# Patient Record
Sex: MALE | Race: WHITE | NOT HISPANIC OR LATINO | Employment: OTHER | URBAN - METROPOLITAN AREA
[De-identification: names, ages, dates, MRNs, and addresses within clinical notes are randomized per-mention and may not be internally consistent; named-entity substitution may affect disease eponyms.]

---

## 2017-01-16 ENCOUNTER — PRE VISIT (OUTPATIENT)
Dept: UROLOGY | Facility: CLINIC | Age: 72
End: 2017-01-16

## 2017-01-16 NOTE — TELEPHONE ENCOUNTER
Spoke with patient and they state appointment is for edex injection follow up. No labs or imaging needed

## 2017-01-19 ENCOUNTER — OFFICE VISIT (OUTPATIENT)
Dept: UROLOGY | Facility: CLINIC | Age: 72
End: 2017-01-19

## 2017-01-19 VITALS
WEIGHT: 190 LBS | SYSTOLIC BLOOD PRESSURE: 127 MMHG | BODY MASS INDEX: 25.73 KG/M2 | HEIGHT: 72 IN | HEART RATE: 66 BPM | DIASTOLIC BLOOD PRESSURE: 81 MMHG

## 2017-01-19 DIAGNOSIS — N52.9 ERECTILE DYSFUNCTION, UNSPECIFIED ERECTILE DYSFUNCTION TYPE: Primary | ICD-10-CM

## 2017-01-19 RX ORDER — VARDENAFIL HYDROCHLORIDE 20 MG/1
20 TABLET ORAL DAILY PRN
Qty: 10 TABLET | Refills: 12 | Status: SHIPPED | OUTPATIENT
Start: 2017-01-19 | End: 2019-06-19 | Stop reason: ALTCHOICE

## 2017-01-19 ASSESSMENT — PAIN SCALES - GENERAL: PAINLEVEL: NO PAIN (0)

## 2017-01-19 NOTE — PATIENT INSTRUCTIONS
Follow up in 1 year for edex medication check     It was a pleasure meeting with you today.  Thank you for allowing me and my team the privilege of caring for you today.  YOU are the reason we are here, and I truly hope we provided you with the excellent service you deserve.  Please let us know if there is anything else we can do for you so that we can be sure you are leaving completely satisfied with your care experience.

## 2017-01-19 NOTE — PROGRESS NOTES
Date: January 19, 2017   Time: 8:46 AM  Patient: Abimael Francisco  MRN: [id number]    HPI/Subjective: Abimael Francisco is a 71 year old male followed by us for stable erectile dysfunction, last seen in January 2016.  At that time, he was using a combination of Edex 40 mcg injections and supplementing with 20 mg Levitra (not together usually).  Since the patient's last visit, he notes no new medical diagnoses nor medications.  He continues to have good results with the Edex & Levitra.  He had his PSA checked with his PCP in August 2015 with the result being 1.48.      No other complaints at this time.     Objective:  /81 mmHg  Pulse 66  Ht 1.829 m (6')  Wt 86.183 kg (190 lb)  BMI 25.76 kg/m2   Gen: In NAD, pleasant & conversant with resident during clinic visit.  Resp: Breathing non-labored on room air    Assessment & Plan: Abimael Francisco is a 70 year old male with stable erectile dysfunction here for prescription renewal.     - Edex & Levitra refilled   - Will plan for return to clinic in one year for evaluation    10min visit, over 50% face to face in counseling/discussion of continuing management of erectile dysfunction.    Harrison Weiss MD  Urology Staff

## 2017-04-10 ENCOUNTER — TELEPHONE (OUTPATIENT)
Dept: UROLOGY | Facility: CLINIC | Age: 72
End: 2017-04-10

## 2017-04-10 NOTE — TELEPHONE ENCOUNTER
Hello,    The  is experiencing delays (edex).  May we have an rx for caverject for him until the edex comes into stock?    Hilario Hutson Pharm. D.  [facility] Pharmacy Manager  [phone]  4/10/2017

## 2018-01-02 ENCOUNTER — PRE VISIT (OUTPATIENT)
Dept: UROLOGY | Facility: CLINIC | Age: 73
End: 2018-01-02

## 2018-01-02 NOTE — TELEPHONE ENCOUNTER
Patient with history of ED coming in for annual follow up. Patient chart reviewed, no need for call, all records available and ready for appointment.

## 2018-04-09 ENCOUNTER — OFFICE VISIT (OUTPATIENT)
Dept: UROLOGY | Facility: CLINIC | Age: 73
End: 2018-04-09
Payer: COMMERCIAL

## 2018-04-09 VITALS — OXYGEN SATURATION: 98 % | SYSTOLIC BLOOD PRESSURE: 108 MMHG | DIASTOLIC BLOOD PRESSURE: 65 MMHG | HEART RATE: 96 BPM

## 2018-04-09 DIAGNOSIS — R39.12 BENIGN PROSTATIC HYPERPLASIA WITH WEAK URINARY STREAM: ICD-10-CM

## 2018-04-09 DIAGNOSIS — N40.1 BENIGN PROSTATIC HYPERPLASIA WITH WEAK URINARY STREAM: ICD-10-CM

## 2018-04-09 DIAGNOSIS — N52.9 ERECTILE DYSFUNCTION, UNSPECIFIED ERECTILE DYSFUNCTION TYPE: Primary | ICD-10-CM

## 2018-04-09 PROCEDURE — 99213 OFFICE O/P EST LOW 20 MIN: CPT | Performed by: UROLOGY

## 2018-04-09 RX ORDER — TAMSULOSIN HYDROCHLORIDE 0.4 MG/1
0.4 CAPSULE ORAL PRN
Refills: 2 | COMMUNITY
Start: 2017-12-20 | End: 2020-04-01

## 2018-04-09 RX ORDER — FINASTERIDE 5 MG/1
5 TABLET, FILM COATED ORAL DAILY
Qty: 90 TABLET | Refills: 3 | Status: SHIPPED | OUTPATIENT
Start: 2018-04-09 | End: 2019-04-15

## 2018-04-09 RX ORDER — ALPROSTADIL 40 UG/ML
INJECTION, POWDER, LYOPHILIZED, FOR SOLUTION INTRACAVERNOUS
OUTPATIENT
Start: 2018-04-09

## 2018-04-09 RX ORDER — PRAVASTATIN SODIUM 80 MG/1
80 TABLET ORAL DAILY
Refills: 3 | COMMUNITY
Start: 2018-01-10

## 2018-04-09 RX ORDER — CLOPIDOGREL BISULFATE 75 MG/1
75 TABLET ORAL DAILY
Refills: 1 | COMMUNITY
Start: 2018-01-08

## 2018-04-09 ASSESSMENT — PAIN SCALES - GENERAL: PAINLEVEL: NO PAIN (0)

## 2018-04-09 NOTE — TELEPHONE ENCOUNTER
Received signed paper prescription for Edex from Dr. Weiss. Prescription hand carried to [facility] pharmacy by writer.     Shweta Hassan RN, BSN

## 2018-04-09 NOTE — NURSING NOTE
Abimael Francisco's goals for this visit include:   Chief Complaint   Patient presents with     RECHECK     Med Refill       He requests these members of his care team be copied on today's visit information: Yes    PCP: Beny Ambrose    Referring Provider:  Beny Ambrose MD  [address] [address]    Chief Complaint   Patient presents with     RECHECK     Med Refill       Initial /65 (BP Location: Left arm, Patient Position: Sitting, Cuff Size: Adult Regular)  Pulse 96  SpO2 98% Estimated body mass index is 25.77 kg/(m^2) as calculated from the following:    Height as of 1/19/17: 1.829 m (6').    Weight as of 1/19/17: 86.2 kg (190 lb).  Medication Reconciliation: complete    Do you need any medication refills at today's visit? Yes      post void residual  90mL

## 2018-04-09 NOTE — MR AVS SNAPSHOT
After Visit Summary   4/9/2018    Abimael Francisco    MRN: [id number]           Patient Information     Date Of Birth          1945        Visit Information        Provider Department      4/9/2018 8:30 AM Harrison Weiss MD [facility]        Today's Diagnoses     Erectile dysfunction, unspecified erectile dysfunction type    -  1    Benign prostatic hyperplasia with weak urinary stream           Follow-ups after your visit        Your next 10 appointments already scheduled     Sep 17, 2018  8:30 AM CDT   Return Visit with Harrison Weiss MD   [facility] ([facility])    [id number] [place] [id number] [phone]              Who to contact     If you have questions or need follow up information about today's clinic visit or your schedule please contact [facility] directly at [phone].  Normal or non-critical lab and imaging results will be communicated to you by FinAnalyticahart, letter or phone within 4 business days after the clinic has received the results. If you do not hear from us within 7 days, please contact the clinic through FinAnalyticahart or phone. If you have a critical or abnormal lab result, we will notify you by phone as soon as possible.  Submit refill requests through [employer] or call your pharmacy and they will forward the refill request to us. Please allow 3 business days for your refill to be completed.          Additional Information About Your Visit        MyChart Information     [employer] gives you secure access to your electronic health record. If you see a primary care provider, you can also send messages to your care team and make appointments. If you have questions, please call your primary care clinic.  If you do not have a primary care provider, please call [phone] and they will assist you.      [employer] is an electronic gateway that provides easy, online access to your medical  records. With Dashride, you can request a clinic appointment, read your test results, renew a prescription or communicate with your care team.     To access your existing account, please contact your [facility] or call [phone] for assistance.        Care EveryWhere ID     This is your Care EveryWhere ID. This could be used by other organizations to access your [place] medical records  [id number]        Your Vitals Were     Pulse Pulse Oximetry                96 98%           Blood Pressure from Last 3 Encounters:   04/09/18 108/65   01/19/17 127/81   12/28/15 116/74    Weight from Last 3 Encounters:   01/19/17 86.2 kg (190 lb)   12/28/15 86.5 kg (190 lb 12.8 oz)   01/14/15 87.5 kg (192 lb 12.8 oz)              Today, you had the following     No orders found for display         Today's Medication Changes          These changes are accurate as of 4/9/18  9:21 AM.  If you have any questions, ask your nurse or doctor.               Start taking these medicines.        Dose/Directions    finasteride 5 MG tablet   Commonly known as:  PROSCAR   Used for:  Benign prostatic hyperplasia with weak urinary stream   Started by:  Harrison Weiss MD        Dose:  5 mg   Take 1 tablet (5 mg) by mouth daily   Quantity:  90 tablet   Refills:  3         These medicines have changed or have updated prescriptions.        Dose/Directions    * alprostadil 40 MCG kit   Commonly known as:  EDEX   This may have changed:  Another medication with the same name was added. Make sure you understand how and when to take each.   Used for:  ED (erectile dysfunction)   Changed by:  Harrison Weiss MD        Dose:  40 mcg   40 mcg by Intracavitary route as needed for erectile dysfunction use no more than 3 times per week   Quantity:  6 each   Refills:  99       * Alprostadil (Vasodilator) 40 MCG Solr   This may have changed:  Another medication with the same name was added. Make sure you understand how  and when to take each.   Used for:  Erectile dysfunction, unspecified erectile dysfunction type   Changed by:  Harrison Weiss MD        Dose:  40 mcg   40 mcg by INTRACAVERNOSAL route daily as needed Maximum use once daily and up to 3x/week.   Quantity:  6 each   Refills:  12       * alprostadil 40 MCG kit   Commonly known as:  EDEX   This may have changed:  Another medication with the same name was added. Make sure you understand how and when to take each.   Used for:  Erectile dysfunction, unspecified erectile dysfunction type   Changed by:  Harrison Weiss MD        Dose:  40 mcg   40 mcg by Intracavitary route as needed for erectile dysfunction use no more than 3 times per week   Quantity:  6 mcg   Refills:  99       * alprostadil 40 MCG kit   Commonly known as:  EDEX   This may have changed:  You were already taking a medication with the same name, and this prescription was added. Make sure you understand how and when to take each.   Used for:  Erectile dysfunction, unspecified erectile dysfunction type   Changed by:  Harrison Weiss MD        Dose:  40 mcg   40 mcg by Intracavitary route as needed for erectile dysfunction use no more than 3 times per week   Quantity:  6 Syringe   Refills:  11       * Notice:  This list has 4 medication(s) that are the same as other medications prescribed for you. Read the directions carefully, and ask your doctor or other care provider to review them with you.      Stop taking these medicines if you haven't already. Please contact your care team if you have questions.     lisinopril 10 MG tablet   Commonly known as:  PRINIVIL/ZESTRIL   Stopped by:  Harrison Weiss MD                Where to get your medicines      These medications were sent to [facility] - [place], [place] - [address], [address]  [address], [facility] [address]     Phone:  [phone]     finasteride 5 MG tablet         Some of these will need a  paper prescription and others can be bought over the counter.  Ask your nurse if you have questions.     Bring a paper prescription for each of these medications     alprostadil 40 MCG kit                Primary Care Provider Office Phone # Fax #    Beny Ambrose [phone]       [address]  [place] [address]        Equal Access to Services     [facility]: Hadii aad ku hadasho Soomaali, waaxda luqadaha, qaybta kaalmada adeegyada, waxay idiin hayaan adeeg kharash la'aan ah. So [facility] [phone].    ATENCIÓN: Si habla español, tiene a warner disposición servicios gratuitos de asistencia lingüística. Mariamame al [phone].    We comply with applicable federal civil rights laws and [place] laws. We do not discriminate on the basis of race, color, national origin, age, disability, sex, sexual orientation, or gender identity.            Thank you!     Thank you for choosing [facility]  for your care. Our goal is always to provide you with excellent care. Hearing back from our patients is one way we can continue to improve our services. Please take a few minutes to complete the written survey that you may receive in the mail after your visit with us. Thank you!             Your Updated Medication List - Protect others around you: Learn how to safely use, store and throw away your medicines at www.disposemymeds.org.          This list is accurate as of 4/9/18  9:21 AM.  Always use your most recent med list.                   Brand Name Dispense Instructions for use Diagnosis    * alprostadil 40 MCG kit    EDEX    6 each    40 mcg by Intracavitary route as needed for erectile dysfunction use no more than 3 times per week    ED (erectile dysfunction)       * Alprostadil (Vasodilator) 40 MCG Solr     6 each    40 mcg by INTRACAVERNOSAL route daily as needed Maximum use once daily and up to 3x/week.    Erectile dysfunction, unspecified erectile dysfunction type     "   * alprostadil 40 MCG kit    EDEX    6 mcg    40 mcg by Intracavitary route as needed for erectile dysfunction use no more than 3 times per week    Erectile dysfunction, unspecified erectile dysfunction type       * alprostadil 40 MCG kit    EDEX    6 Syringe    40 mcg by Intracavitary route as needed for erectile dysfunction use no more than 3 times per week    Erectile dysfunction, unspecified erectile dysfunction type       BD insulin syringe 29G X 1/2\" 1 ML Misc   Generic drug:  Insulin Syringe     250 each    1 Syringe. Use as directed    Erectile dysfunction       clopidogrel 75 MG tablet    PLAVIX     Take 75 mg by mouth daily        finasteride 5 MG tablet    PROSCAR    90 tablet    Take 1 tablet (5 mg) by mouth daily    Benign prostatic hyperplasia with weak urinary stream       fish Oil 1200 MG capsule      3 CAPS DAILY        LIPITOR PO      1/2 tablet daily        metFORMIN 500 MG tablet    GLUCOPHAGE     1 TABLET DAILY WITH FOOD        MULTI VITAMIN MENS PO      1 TABLET DAILY        pravastatin 80 MG tablet    PRAVACHOL     Take 80 mg by mouth daily        tamsulosin 0.4 MG capsule    FLOMAX     Take 0.4 mg by mouth as needed        * vardenafil 20 MG tablet    LEVITRA    10 tablet    Take 1 tablet by mouth daily as needed for erectile dysfunction.    Erectile dysfunction       * vardenafil 20 MG tablet    LEVITRA    10 tablet    Take 1 tablet (20 mg) by mouth daily as needed for erectile dysfunction    Erectile dysfunction, unspecified erectile dysfunction type       * vardenafil 20 MG tablet    LEVITRA    10 tablet    Take 1 tablet (20 mg) by mouth daily as needed for erectile dysfunction    Erectile dysfunction, unspecified erectile dysfunction type       * Notice:  This list has 7 medication(s) that are the same as other medications prescribed for you. Read the directions carefully, and ask your doctor or other care provider to review them with you.      "

## 2018-04-09 NOTE — TELEPHONE ENCOUNTER
Hello,  last fill date:01-  Last quantity:6    Thank You,  Petra Kennedy  Pharmacy Technician  [facility]  [phone]

## 2018-04-09 NOTE — PROGRESS NOTES
Pt here for Edex renewal.    Levitra not working well for him anymore.    Noting slow flow at time, especially at night.  Nocturia 1 usually, sometimes more often.  Varies on intake.  In rare cases up many times per night.      A-  - Erectile dysfunction  - BPH with complains of slow flow, especially at night.    Plan  - renewed Edex 40mcg kit for him.  - add finasteride  - continue Flomax.  - return to clinic 3-6 months for lower urinary tract symptoms check.  - PVR today 90cc.      Aj BENITES    15min visit, over 50% face to face in counseling/discussion of above issues.

## 2018-09-17 ENCOUNTER — OFFICE VISIT (OUTPATIENT)
Dept: UROLOGY | Facility: CLINIC | Age: 73
End: 2018-09-17
Payer: COMMERCIAL

## 2018-09-17 VITALS — HEART RATE: 74 BPM | DIASTOLIC BLOOD PRESSURE: 79 MMHG | SYSTOLIC BLOOD PRESSURE: 134 MMHG | OXYGEN SATURATION: 96 %

## 2018-09-17 DIAGNOSIS — N52.9 ERECTILE DYSFUNCTION, UNSPECIFIED ERECTILE DYSFUNCTION TYPE: ICD-10-CM

## 2018-09-17 DIAGNOSIS — N40.1 BENIGN PROSTATIC HYPERPLASIA WITH WEAK URINARY STREAM: Primary | ICD-10-CM

## 2018-09-17 DIAGNOSIS — R39.12 BENIGN PROSTATIC HYPERPLASIA WITH WEAK URINARY STREAM: Primary | ICD-10-CM

## 2018-09-17 PROCEDURE — 99213 OFFICE O/P EST LOW 20 MIN: CPT | Performed by: UROLOGY

## 2018-09-17 ASSESSMENT — PAIN SCALES - GENERAL: PAINLEVEL: NO PAIN (0)

## 2018-09-17 NOTE — NURSING NOTE
Abimael Francisco's goals for this visit include:   Chief Complaint   Patient presents with     RECHECK     6 month f/u       He requests these members of his care team be copied on today's visit information: Yes    PCP: Beny Ambrose    Referring Provider:  No referring provider defined for this encounter.    /79 (BP Location: Left arm, Patient Position: Sitting, Cuff Size: Adult Regular)  Pulse 74  SpO2 96%    Do you need any medication refills at today's visit? No

## 2018-09-17 NOTE — PROGRESS NOTES
"Abimael Francisco is a 72 year old male is here for BPH follow-up.  He has been using Flomax and finasteride.    Previously used Levitra, but this doesn't work well anymore.  He's had good success with Edex and continues to use this as needed.  He's noted better flow with adding 5ARi 5 months ago.  Notes better flow day and night, often good stream.  No new complaints or bother.  Discussed sound wave therapy.  I recommended he save his $.    Current Outpatient Prescriptions   Medication     alprostadil (EDEX) 40 MCG injection     alprostadil (EDEX) 40 MCG kit     alprostadil (EDEX) 40 MCG kit     finasteride (PROSCAR) 5 MG tablet     FISH OIL 1200 MG OR CAPS     LIPITOR OR     METFORMIN  MG OR TABS     MULTI VITAMIN MENS OR     vardenafil (LEVITRA) 20 MG tablet     vardenafil (LEVITRA) 20 MG tablet     vardenafil (LEVITRA) 20 MG tablet     Alprostadil, Vasodilator, 40 MCG SOLR     clopidogrel (PLAVIX) 75 MG tablet     Insulin Syringe-Needle U-100 (BD INSULIN SYRINGE) 29G X 1/2\" 1 ML MISC     pravastatin (PRAVACHOL) 80 MG tablet     tamsulosin (FLOMAX) 0.4 MG capsule     No current facility-administered medications for this visit.      /79 (BP Location: Left arm, Patient Position: Sitting, Cuff Size: Adult Regular)  Pulse 74  SpO2 96%   Alert, oriented, nad  Eyes anicteric.  Pulse regular  Abdomen nondistended.  resps normal, non-labored.   exam deferred.    A-  Erectile dysfunction  BPH    Plan  Continue Edex  Continue Flomax and Finsteride  Return to clinic 1 year, sooner if needed.  PVR today 52, better than 90cc before finasteride.    Aj BENITES          "

## 2018-09-17 NOTE — MR AVS SNAPSHOT
After Visit Summary   9/17/2018    Abimael Francisco    MRN: [id number]           Patient Information     Date Of Birth          1945        Visit Information        Provider Department      9/17/2018 8:30 AM Harrison Weiss MD [facility]        Today's Diagnoses     Benign prostatic hyperplasia with weak urinary stream    -  1    Erectile dysfunction, unspecified erectile dysfunction type           Follow-ups after your visit        Follow-up notes from your care team     Return in about 1 year (around 9/17/2019), or if symptoms worsen or fail to improve.      Who to contact     If you have questions or need follow up information about today's clinic visit or your schedule please contact [facility] directly at [phone].  Normal or non-critical lab and imaging results will be communicated to you by Neogrowthhart, letter or phone within 4 business days after the clinic has received the results. If you do not hear from us within 7 days, please contact the clinic through Neogrowthhart or phone. If you have a critical or abnormal lab result, we will notify you by phone as soon as possible.  Submit refill requests through [employer] or call your pharmacy and they will forward the refill request to us. Please allow 3 business days for your refill to be completed.          Additional Information About Your Visit        Neogrowthhart Information     [employer] gives you secure access to your electronic health record. If you see a primary care provider, you can also send messages to your care team and make appointments. If you have questions, please call your primary care clinic.  If you do not have a primary care provider, please call [phone] and they will assist you.      [employer] is an electronic gateway that provides easy, online access to your medical records. With [employer], you can request a clinic appointment, read your test results, renew a prescription or communicate with your  care team.     To access your existing account, please contact your [facility] or call [phone] for assistance.        Care EveryWhere ID     This is your Care EveryWhere ID. This could be used by other organizations to access your [place] medical records  [id number]        Your Vitals Were     Pulse Pulse Oximetry                74 96%           Blood Pressure from Last 3 Encounters:   09/17/18 134/79   04/09/18 108/65   01/19/17 127/81    Weight from Last 3 Encounters:   01/19/17 86.2 kg (190 lb)   12/28/15 86.5 kg (190 lb 12.8 oz)   01/14/15 87.5 kg (192 lb 12.8 oz)              Today, you had the following     No orders found for display       Primary Care Provider Office Phone # Fax #    Beny Ambrose [phone]       [address] [place] [address]        Equal Access to Services     TAURUS FREITAS : Hadii aad ku hadasho Soomaali, waaxda luqadaha, qaybta kaalmada adeegyada, waxay idiin hayaan gonzaleseg jeremy macario . So [facility] [phone].    ATENCIÓN: Si habla español, tiene a warner disposición servicios gratuitos de asistencia lingüística. Llame al [phone].    We comply with applicable federal civil rights laws and [place] laws. We do not discriminate on the basis of race, color, national origin, age, disability, sex, sexual orientation, or gender identity.            Thank you!     Thank you for choosing [facility]  for your care. Our goal is always to provide you with excellent care. Hearing back from our patients is one way we can continue to improve our services. Please take a few minutes to complete the written survey that you may receive in the mail after your visit with us. Thank you!             Your Updated Medication List - Protect others around you: Learn how to safely use, store and throw away your medicines at www.disposemymeds.org.          This list is accurate as of 9/17/18  8:47 AM.  Always  "use your most recent med list.                   Brand Name Dispense Instructions for use Diagnosis    * alprostadil 40 MCG kit    EDEX    6 each    40 mcg by Intracavitary route as needed for erectile dysfunction use no more than 3 times per week    ED (erectile dysfunction)       * Alprostadil (Vasodilator) 40 MCG Solr     6 each    40 mcg by INTRACAVERNOSAL route daily as needed Maximum use once daily and up to 3x/week.    Erectile dysfunction, unspecified erectile dysfunction type       * alprostadil 40 MCG kit    EDEX    6 mcg    40 mcg by Intracavitary route as needed for erectile dysfunction use no more than 3 times per week    Erectile dysfunction, unspecified erectile dysfunction type       * alprostadil 40 MCG kit    EDEX    6 Syringe    40 mcg by Intracavitary route as needed for erectile dysfunction use no more than 3 times per week    Erectile dysfunction, unspecified erectile dysfunction type       BD insulin syringe 29G X 1/2\" 1 ML Misc   Generic drug:  Insulin Syringe     250 each    1 Syringe. Use as directed    Erectile dysfunction       clopidogrel 75 MG tablet    PLAVIX     Take 75 mg by mouth daily        finasteride 5 MG tablet    PROSCAR    90 tablet    Take 1 tablet (5 mg) by mouth daily    Benign prostatic hyperplasia with weak urinary stream       fish Oil 1200 MG capsule      3 CAPS DAILY        LIPITOR PO      1/2 tablet daily        metFORMIN 500 MG tablet    GLUCOPHAGE     1 TABLET DAILY WITH FOOD        MULTI VITAMIN MENS PO      1 TABLET DAILY        pravastatin 80 MG tablet    PRAVACHOL     Take 80 mg by mouth daily        tamsulosin 0.4 MG capsule    FLOMAX     Take 0.4 mg by mouth as needed        * vardenafil 20 MG tablet    LEVITRA    10 tablet    Take 1 tablet by mouth daily as needed for erectile dysfunction.    Erectile dysfunction       * vardenafil 20 MG tablet    LEVITRA    10 tablet    Take 1 tablet (20 mg) by mouth daily as needed for erectile dysfunction    Erectile " dysfunction, unspecified erectile dysfunction type       * vardenafil 20 MG tablet    LEVITRA    10 tablet    Take 1 tablet (20 mg) by mouth daily as needed for erectile dysfunction    Erectile dysfunction, unspecified erectile dysfunction type       * Notice:  This list has 7 medication(s) that are the same as other medications prescribed for you. Read the directions carefully, and ask your doctor or other care provider to review them with you.

## 2019-04-15 DIAGNOSIS — N52.9 ERECTILE DYSFUNCTION, UNSPECIFIED ERECTILE DYSFUNCTION TYPE: ICD-10-CM

## 2019-04-15 DIAGNOSIS — R39.12 BENIGN PROSTATIC HYPERPLASIA WITH WEAK URINARY STREAM: ICD-10-CM

## 2019-04-15 DIAGNOSIS — N40.1 BENIGN PROSTATIC HYPERPLASIA WITH WEAK URINARY STREAM: ICD-10-CM

## 2019-04-15 RX ORDER — FINASTERIDE 5 MG/1
5 TABLET, FILM COATED ORAL DAILY
Qty: 90 TABLET | Refills: 3 | Status: SHIPPED | OUTPATIENT
Start: 2019-04-15 | End: 2020-04-01

## 2019-04-15 NOTE — TELEPHONE ENCOUNTER
Received signed paper prescription for Edex from Dr. Weiss. Paper prescription brought to [facility] pharmacy by writer.    Shweta Hassan RN, BSN

## 2019-04-15 NOTE — TELEPHONE ENCOUNTER
Hello,  last fill date:11-  Last quantity:90 days    Thank You,  Petra Kennedy  Pharmacy Technician  [facility]  [phone]

## 2019-06-19 ENCOUNTER — OFFICE VISIT (OUTPATIENT)
Dept: UROLOGY | Facility: CLINIC | Age: 74
End: 2019-06-19
Payer: MEDICARE

## 2019-06-19 VITALS — DIASTOLIC BLOOD PRESSURE: 67 MMHG | HEART RATE: 74 BPM | SYSTOLIC BLOOD PRESSURE: 115 MMHG | OXYGEN SATURATION: 96 %

## 2019-06-19 DIAGNOSIS — N40.1 BPH WITH OBSTRUCTION/LOWER URINARY TRACT SYMPTOMS: ICD-10-CM

## 2019-06-19 DIAGNOSIS — N52.9 ERECTILE DYSFUNCTION, UNSPECIFIED ERECTILE DYSFUNCTION TYPE: Primary | ICD-10-CM

## 2019-06-19 DIAGNOSIS — N13.8 BPH WITH OBSTRUCTION/LOWER URINARY TRACT SYMPTOMS: ICD-10-CM

## 2019-06-19 PROCEDURE — 99213 OFFICE O/P EST LOW 20 MIN: CPT | Performed by: UROLOGY

## 2019-06-19 RX ORDER — TAMSULOSIN HYDROCHLORIDE 0.4 MG/1
0.4 CAPSULE ORAL DAILY
Qty: 90 CAPSULE | Refills: 3 | Status: SHIPPED | OUTPATIENT
Start: 2019-06-19 | End: 2020-04-01

## 2019-06-19 ASSESSMENT — PAIN SCALES - GENERAL: PAINLEVEL: NO PAIN (0)

## 2019-06-19 NOTE — PROGRESS NOTES
Abimael Francisco is a 73 year old male is here for BPH follow-up.  He has been using Flomax and finasteride.    He's had good success with Edex 40mcg and continues to use this as needed.  Requests refill.  No new complaints or bother.  He feels that urination is going well for him.     ROS  Overall voiding well.  No new significant voiding complaints.  No gross hematuria.  No new bothersome symptoms or signs.    Current Outpatient Medications   Medication     alprostadil (EDEX) 40 MCG injection     alprostadil (EDEX) 40 MCG kit     alprostadil (EDEX) 40 MCG kit     Alprostadil, Vasodilator, 40 MCG SOLR     Alprostadil, Vasodilator, 40 MCG SOLR     clopidogrel (PLAVIX) 75 MG tablet     finasteride (PROSCAR) 5 MG tablet     FISH OIL 1200 MG OR CAPS     LIPITOR OR     METFORMIN  MG OR TABS     MULTI VITAMIN MENS OR     pravastatin (PRAVACHOL) 80 MG tablet     tamsulosin (FLOMAX) 0.4 MG capsule     tamsulosin (FLOMAX) 0.4 MG capsule     No current facility-administered medications for this visit.      /67 (BP Location: Left arm, Patient Position: Sitting, Cuff Size: Adult Regular)   Pulse 74   SpO2 96%    Alert, oriented, nad  Eyes anicteric.  Pulse regular  Abdomen nondistended.  resps normal, non-labored.   exam deferred.    A-  Erectile dysfunction  BPH    Plan  Refilled Edex  Continue Flomax and Finsteride.  Refilled alpha-blocker for him today.  Return to clinic 1 year, sooner if needed.  PVR 52cc 9/2018.  Not rechecked today.    15min visit, over 50% face to face in counseling/discussion of erectile dysfunction and BPH medication management.    Aj BENITES

## 2019-06-19 NOTE — NURSING NOTE
Abimael Francisco's goals for this visit include:   Chief Complaint   Patient presents with     RECHECK     Yearly f/u- Flomax       He requests these members of his care team be copied on today's visit information: Yes    PCP: Beny Ambrose    Referring Provider:  Beny Ambrose MD  [facility]  [address] [place] [address]    /67 (BP Location: Left arm, Patient Position: Sitting, Cuff Size: Adult Regular)   Pulse 74   SpO2 96%     Do you need any medication refills at today's visit? Yes- Flomax

## 2019-10-04 ENCOUNTER — HEALTH MAINTENANCE LETTER (OUTPATIENT)
Age: 74
End: 2019-10-04

## 2019-11-29 ENCOUNTER — TELEPHONE (OUTPATIENT)
Dept: UROLOGY | Facility: CLINIC | Age: 74
End: 2019-11-29

## 2019-11-29 DIAGNOSIS — N52.9 ERECTILE DYSFUNCTION, UNSPECIFIED ERECTILE DYSFUNCTION TYPE: ICD-10-CM

## 2019-11-29 NOTE — TELEPHONE ENCOUNTER
Health Call Center    Phone Message    May a detailed message be left on voicemail: yes    Reason for Call: Medication Refill Request    Has the patient contacted the pharmacy for the refill? Yes   Name of medication being requested: alprostadil (EDEX) 40 MCG kit  Provider who prescribed the medication: Dr. Weiss   Date medication is needed: 12/2/2019   Patient called stating that he is almost out of his Rx and would need a new Rx to get any refills. He is scheduled 1/15/2020 and wants to know if he can get it before his appointment. Please advise. Thank you.    Action Taken: Message routed to:  Adult Clinics: Urology p [id number]

## 2019-11-29 NOTE — TELEPHONE ENCOUNTER
Medication request: Alprostadil, Vasodilator, 40 MCG SOLR  Si mcg by INTRACAVERNOSAL route daily as needed (take for intercourse.  Once daily and up to 3 times a week.)   Prescription written: 19  Last Qty: 6 syringe  Pt's last office visit: 19  Next scheduled office visit: 1/15/20    Chart reviewed and medication not on refill protocol. Refill request sent to Dr. Weiss to review and sign if appropriate.     Called and spoke to patient who is aware of the above information.    Shweta Hassan RN, BSN

## 2019-12-02 NOTE — TELEPHONE ENCOUNTER
Received message from Dr. Weiss and everett to refill Edex 40mcg per 6/19/19 order. Medication refilled and sent for Dr. Weiss to review and sign. Prescription sent to [facility] in [place].    Called and spoke to patient who is aware of the above information.    Shweta Hassan RN, BSN

## 2020-02-10 ENCOUNTER — HEALTH MAINTENANCE LETTER (OUTPATIENT)
Age: 75
End: 2020-02-10

## 2020-04-01 ENCOUNTER — VIRTUAL VISIT (OUTPATIENT)
Dept: UROLOGY | Facility: CLINIC | Age: 75
End: 2020-04-01

## 2020-04-01 DIAGNOSIS — R39.12 BENIGN PROSTATIC HYPERPLASIA WITH WEAK URINARY STREAM: ICD-10-CM

## 2020-04-01 DIAGNOSIS — N40.1 BPH WITH OBSTRUCTION/LOWER URINARY TRACT SYMPTOMS: ICD-10-CM

## 2020-04-01 DIAGNOSIS — N52.9 ERECTILE DYSFUNCTION, UNSPECIFIED ERECTILE DYSFUNCTION TYPE: ICD-10-CM

## 2020-04-01 DIAGNOSIS — N40.1 BENIGN PROSTATIC HYPERPLASIA WITH WEAK URINARY STREAM: ICD-10-CM

## 2020-04-01 DIAGNOSIS — N13.8 BPH WITH OBSTRUCTION/LOWER URINARY TRACT SYMPTOMS: ICD-10-CM

## 2020-04-01 PROCEDURE — 99207 ZZC NO BILLABLE SERVICE THIS VISIT: CPT | Performed by: UROLOGY

## 2020-04-01 RX ORDER — ALPROSTADIL 40 UG/ML
40 INJECTION, POWDER, LYOPHILIZED, FOR SOLUTION INTRACAVERNOUS PRN
Qty: 6 KIT | Refills: 30 | Status: SHIPPED | OUTPATIENT
Start: 2020-04-01 | End: 2021-02-24

## 2020-04-01 RX ORDER — TAMSULOSIN HYDROCHLORIDE 0.4 MG/1
0.4 CAPSULE ORAL DAILY
Qty: 90 CAPSULE | Refills: 3 | Status: SHIPPED | OUTPATIENT
Start: 2020-04-01 | End: 2021-02-24

## 2020-04-01 RX ORDER — FINASTERIDE 5 MG/1
5 TABLET, FILM COATED ORAL DAILY
Qty: 90 TABLET | Refills: 3 | Status: SHIPPED | OUTPATIENT
Start: 2020-04-01 | End: 2021-02-24

## 2020-04-01 NOTE — PROGRESS NOTES
"Abimael Francisco is a 74 year old male who is being evaluated via a billable telephone visit.      The patient has been notified of following:     \"This telephone visit will be conducted via a call between you and your physician/provider. We have found that certain health care needs can be provided without the need for a physical exam.  This service lets us provide the care you need with a short phone conversation.  If a prescription is necessary we can send it directly to your pharmacy.  If lab work is needed we can place an order for that and you can then stop by our lab to have the test done at a later time.    If during the course of the call the physician/provider feels a telephone visit is not appropriate, you will not be charged for this service.\"     Patient has given verbal consent for Telephone visit?  Yes    Abimael Francisco complains of    Chief Complaint   Patient presents with     Consult For     edex cbg uyrine prescription [id number]       I have reviewed and updated the patient's Past Medical History, Social History, Family History and Medication List.    ALLERGIES  Patient has no known allergies.    Additional provider notes:  Pt with ED.  He would like refill of 40mcg Edex from [facility].  This therapy is working well for him.    He also needs refills of Flomax and finasteride as well.  These are also working well, he's able to void without trouble.  These are to be sent to the [facility] in [place].      Phone call duration: 3  Minutes starting at 0835. No charge LOS.      Harrison Weiss MD    "

## 2020-04-01 NOTE — NURSING NOTE
"Abimael Francisco is a 74 year old male who is being evaluated via a billable telephone visit.      The patient has been notified of following:     \"This telephone visit will be conducted via a call between you and your physician/provider. We have found that certain health care needs can be provided without the need for a physical exam.  This service lets us provide the care you need with a short phone conversation.  If a prescription is necessary we can send it directly to your pharmacy.  If lab work is needed we can place an order for that and you can then stop by our lab to have the test done at a later time.    If during the course of the call the physician/provider feels a telephone visit is not appropriate, you will not be charged for this service.\"     Patient has given verbal consent for Telephone visit?  Yes    Lew Michael MA      Abimael Francisco complains of    Chief Complaint   Patient presents with     Consult For     edex cbg uyrine prescription [id number]       I have reviewed and updated the patient's Past Medical History, Social History, Family History and Medication List.    ALLERGIES  Patient has no known allergies.        "

## 2020-04-03 DIAGNOSIS — N40.1 BENIGN PROSTATIC HYPERPLASIA WITH WEAK URINARY STREAM: ICD-10-CM

## 2020-04-03 DIAGNOSIS — R39.12 BENIGN PROSTATIC HYPERPLASIA WITH WEAK URINARY STREAM: ICD-10-CM

## 2020-04-07 RX ORDER — FINASTERIDE 5 MG/1
TABLET, FILM COATED ORAL
Qty: 90 TABLET | Refills: 3 | OUTPATIENT
Start: 2020-04-07

## 2020-06-09 ENCOUNTER — TELEPHONE (OUTPATIENT)
Dept: UROLOGY | Facility: CLINIC | Age: 75
End: 2020-06-09

## 2020-06-09 DIAGNOSIS — N52.9 ERECTILE DYSFUNCTION, UNSPECIFIED ERECTILE DYSFUNCTION TYPE: Primary | ICD-10-CM

## 2020-06-09 NOTE — TELEPHONE ENCOUNTER
Patient started their own Pa. Question set received via fax, located in MAIN UMP FOLDER

## 2020-06-10 NOTE — TELEPHONE ENCOUNTER
Central Prior Authorization Team   Phone: [phone]      PA Initiation    Medication: alprostadil (EDEX) 40 MCG kit -PA initiated  Insurance Company: [employer] - Phone [phone] Fax [phone]  Pharmacy Filling the Rx: [facility] - [place], [place] - [facility] [address]  Filling Pharmacy Phone: [phone]  Filling Pharmacy Fax:    Start Date: 6/10/2020

## 2020-06-10 NOTE — TELEPHONE ENCOUNTER
Prior Authorization Retail Medication Request    Medication/Dose: alprostadil (EDEX) 40 MCG kit  ICD code (if different than what is on RX):    Previously Tried and Failed:    Rationale:      Insurance Name:  [facility]  Insurance ID:  [id number]       Pharmacy Information (if different than what is on RX)  Name:    Phone:

## 2020-06-10 NOTE — TELEPHONE ENCOUNTER
Attempted to reach patient by phone, but no answer. Left generic message with request for patient to return call to clinic.     When patient returns call, will request for call center to verify that updated insurance information is in patient's chart. Once updated insurance information is received, will send request to the PA team to discuss approval for Edex medication.    Shweta Hassan RN, BSN

## 2020-06-10 NOTE — TELEPHONE ENCOUNTER
Sent additional information to [facility] via [employer]

## 2020-06-11 NOTE — TELEPHONE ENCOUNTER
PRIOR AUTHORIZATION DENIED    Medication: alprostadil (EDEX) 40 MCG kit -PA denied    Denial Date: 6/10/2020    Denial Rational:         Appeal Information:

## 2020-06-16 NOTE — TELEPHONE ENCOUNTER
Called and spoke to patient regarding note below. Informed patient that a message will be sent to Dr. Weiss regarding recommendations on how to proceed. Informed patient that he will be contacted with recommendations from Dr. Weiss once additional information is known. Patient verbalized understanding and was comfortable with plan.    Shweta Hassan RN, BSN

## 2020-06-16 NOTE — TELEPHONE ENCOUNTER
Patient calling to follow up on the denied auth and making sure the clinic is aware. He also received a letter. Wanting to know if an appeal is being worked, or will he be prescribed something else?

## 2020-06-17 NOTE — TELEPHONE ENCOUNTER
"Received message from Dr. Weiss who reports that [facility] pharmacy on [address] can make a generic alprostadil injection if he wants to try that route.     Called and spoke to patient who is aware of the above information from Dr. Weiss. Patient reports that he would like to go with the compounded version of alprostadil. Informed patient that a message will be sent for Dr. Weiss to review and place order to the [facility]. Patient stated, \"I usually have prescriptions mailed and have a fee.\" Informed patient that we will request for the [facility] pharmacy to contact him regarding payment and delivery. Patient was comfortable with plan.     Prescription pended and sent for Dr. Weiss to review and sign.    Shweta Hassan RN, BSN          "

## 2020-06-19 ENCOUNTER — TELEPHONE (OUTPATIENT)
Dept: UROLOGY | Facility: CLINIC | Age: 75
End: 2020-06-19

## 2020-06-19 NOTE — TELEPHONE ENCOUNTER
[facility] Call Center    Phone Message    May a detailed message be left on voicemail: no     Reason for Call: Pt would like to change his pharmacy to the [facility] pharm for the EDEX.  If patient unable to get this due to PA, then can he have KALEN (?)       [id number] - [place], [place] - [address] [facility] [address] [place] [address]    Action Taken: Message routed to:  Adult Clinics: Urology p [id number]    Travel Screening:

## 2020-09-16 DIAGNOSIS — N52.9 ERECTILE DYSFUNCTION, UNSPECIFIED ERECTILE DYSFUNCTION TYPE: ICD-10-CM

## 2020-09-16 NOTE — TELEPHONE ENCOUNTER
Health Call Center    Phone Message    May a detailed message be left on voicemail: yes     Reason for Call: Medication Refill Request    Has the patient contacted the pharmacy for the refill? Yes   Name of medication being requested: COMPOUNDED NON-CONTROLLED SUBSTANCE (CMPD RX) - PHARMACY TO MIX COMPOUNDED MEDICATION [[id number]] (Order [id number])    Provider who prescribed the medication: Harrison Weiss MD   Pharmacy: [facility] [address]   Date medication is needed: ASAP Patient is out         Action Taken: Message routed to:  Adult Clinics: Urology p [id number]    Travel Screening: Positive: unable to schedule an appointment, due to positive travel screen.  Informed patient/caller that a message will be sent to the clinic; who will then call them back to discuss next steps.     Patient screened positive for:

## 2020-10-05 ENCOUNTER — TELEPHONE (OUTPATIENT)
Dept: UROLOGY | Facility: CLINIC | Age: 75
End: 2020-10-05

## 2020-10-05 DIAGNOSIS — N52.9 ERECTILE DYSFUNCTION, UNSPECIFIED ERECTILE DYSFUNCTION TYPE: ICD-10-CM

## 2020-10-05 NOTE — TELEPHONE ENCOUNTER
[facility] Call Center    Phone Message    May a detailed message be left on voicemail: yes     Reason for Call: The patient stated he usually has a visit with Dr. Weiss yearly.  Last seen 04/01/2020.  The patient stated his prescription for Alprostadil has one refill left.  He is requesting a call to see if he will need to see Dr. Weiss before April to be able refill the medication.     Action Taken: Message routed to:  Adult Clinics: Urology p [id number]    Travel Screening: Not Applicable

## 2020-11-14 ENCOUNTER — HEALTH MAINTENANCE LETTER (OUTPATIENT)
Age: 75
End: 2020-11-14

## 2020-12-22 DIAGNOSIS — N52.9 ERECTILE DYSFUNCTION, UNSPECIFIED ERECTILE DYSFUNCTION TYPE: ICD-10-CM

## 2020-12-23 NOTE — TELEPHONE ENCOUNTER
COMPOUNDED NON-CONTROLLED SUBSTANCE (CMPD RX) - PHARMACY TO MIX COMPOUNDED MEDICATION       Called pharmacy,  Pt still has refills for Pt care on 12/23/2020.   No further action needed.    Kavita Zuluaga RN  Central Triage Red Flags/Med Refills    COMPOUNDED NON-CONTROLLED SUBSTANCE (CMPD RX) - PHARMACY TO MIX COMPOUNDED MEDICATION 5 mL 30 10/7/2020  No   Sig: Alprostadil 50mcg/mL. Inject 0.8mL by Intracavitary route as needed for erectile dysfunction. Use no more than 3 times per week   Sent to pharmacy as: COMPOUNDED NON-CONTROLLED SUBSTANCE - PHARMACY TO MIX COMPOUNDED MEDICATION   Class: E-Prescribe   Order: [id number]   E-Prescribing Status: Receipt confirmed by pharmacy (10/7/2020  9:50 AM CDT)   Printout Tracking    External Result Report   Medication Administration Instructions    Alprostadil 50mcg/mL. Inject 0.8mL by Intracavitary route as needed for erectile dysfunction. Use no more than 3 times per week   Pharmacy    [facility] - [place], [place] - [facility] BRITANY Zuluaga RN  Central Triage Red Flags/Med Refills

## 2021-02-24 ENCOUNTER — VIRTUAL VISIT (OUTPATIENT)
Dept: UROLOGY | Facility: CLINIC | Age: 76
End: 2021-02-24
Payer: COMMERCIAL

## 2021-02-24 DIAGNOSIS — N52.9 ERECTILE DYSFUNCTION, UNSPECIFIED ERECTILE DYSFUNCTION TYPE: Primary | ICD-10-CM

## 2021-02-24 DIAGNOSIS — N40.1 BENIGN PROSTATIC HYPERPLASIA WITH WEAK URINARY STREAM: ICD-10-CM

## 2021-02-24 DIAGNOSIS — R39.12 BENIGN PROSTATIC HYPERPLASIA WITH WEAK URINARY STREAM: ICD-10-CM

## 2021-02-24 DIAGNOSIS — N40.1 BPH WITH OBSTRUCTION/LOWER URINARY TRACT SYMPTOMS: ICD-10-CM

## 2021-02-24 DIAGNOSIS — N13.8 BPH WITH OBSTRUCTION/LOWER URINARY TRACT SYMPTOMS: ICD-10-CM

## 2021-02-24 PROCEDURE — 99207 PR NO BILLABLE SERVICE THIS VISIT: CPT | Performed by: UROLOGY

## 2021-02-24 RX ORDER — FINASTERIDE 5 MG/1
5 TABLET, FILM COATED ORAL DAILY
Qty: 90 TABLET | Refills: 3 | Status: SHIPPED | OUTPATIENT
Start: 2021-02-24 | End: 2021-04-14

## 2021-02-24 RX ORDER — ALPROSTADIL 40 UG/ML
40 INJECTION, POWDER, LYOPHILIZED, FOR SOLUTION INTRACAVERNOUS PRN
Qty: 6 KIT | Refills: 30 | Status: SHIPPED | OUTPATIENT
Start: 2021-02-24 | End: 2023-05-31

## 2021-02-24 RX ORDER — TAMSULOSIN HYDROCHLORIDE 0.4 MG/1
0.4 CAPSULE ORAL DAILY
Qty: 90 CAPSULE | Refills: 3 | Status: SHIPPED | OUTPATIENT
Start: 2021-02-24 | End: 2021-04-14

## 2021-02-24 NOTE — PROGRESS NOTES
I was not able to reach the pt on video today.  Medications renewed, follow-up at his convenience

## 2021-03-28 ENCOUNTER — HEALTH MAINTENANCE LETTER (OUTPATIENT)
Age: 76
End: 2021-03-28

## 2021-04-01 DIAGNOSIS — N13.8 BPH WITH OBSTRUCTION/LOWER URINARY TRACT SYMPTOMS: ICD-10-CM

## 2021-04-01 DIAGNOSIS — R39.12 BENIGN PROSTATIC HYPERPLASIA WITH WEAK URINARY STREAM: ICD-10-CM

## 2021-04-01 DIAGNOSIS — N40.1 BENIGN PROSTATIC HYPERPLASIA WITH WEAK URINARY STREAM: ICD-10-CM

## 2021-04-01 DIAGNOSIS — N40.1 BPH WITH OBSTRUCTION/LOWER URINARY TRACT SYMPTOMS: ICD-10-CM

## 2021-04-05 RX ORDER — FINASTERIDE 5 MG/1
TABLET, FILM COATED ORAL
Qty: 90 TABLET | Refills: 3 | OUTPATIENT
Start: 2021-04-05

## 2021-04-05 RX ORDER — TAMSULOSIN HYDROCHLORIDE 0.4 MG/1
CAPSULE ORAL
Qty: 90 CAPSULE | Refills: 3 | OUTPATIENT
Start: 2021-04-05

## 2021-04-14 ENCOUNTER — VIRTUAL VISIT (OUTPATIENT)
Dept: UROLOGY | Facility: CLINIC | Age: 76
End: 2021-04-14
Payer: COMMERCIAL

## 2021-04-14 DIAGNOSIS — N40.1 BENIGN PROSTATIC HYPERPLASIA WITH WEAK URINARY STREAM: ICD-10-CM

## 2021-04-14 DIAGNOSIS — R39.12 BENIGN PROSTATIC HYPERPLASIA WITH WEAK URINARY STREAM: ICD-10-CM

## 2021-04-14 DIAGNOSIS — N52.9 ERECTILE DYSFUNCTION, UNSPECIFIED ERECTILE DYSFUNCTION TYPE: Primary | ICD-10-CM

## 2021-04-14 DIAGNOSIS — N40.1 BPH WITH OBSTRUCTION/LOWER URINARY TRACT SYMPTOMS: ICD-10-CM

## 2021-04-14 DIAGNOSIS — N13.8 BPH WITH OBSTRUCTION/LOWER URINARY TRACT SYMPTOMS: ICD-10-CM

## 2021-04-14 PROCEDURE — 99212 OFFICE O/P EST SF 10 MIN: CPT | Mod: 95 | Performed by: UROLOGY

## 2021-04-14 RX ORDER — FINASTERIDE 5 MG/1
5 TABLET, FILM COATED ORAL DAILY
Qty: 90 TABLET | Refills: 3 | Status: SHIPPED | OUTPATIENT
Start: 2021-04-14 | End: 2022-05-18

## 2021-04-14 RX ORDER — TAMSULOSIN HYDROCHLORIDE 0.4 MG/1
0.4 CAPSULE ORAL DAILY
Qty: 90 CAPSULE | Refills: 3 | Status: SHIPPED | OUTPATIENT
Start: 2021-04-14 | End: 2022-05-18

## 2021-04-14 NOTE — PROGRESS NOTES
Abimael is a 75 year old who is being evaluated via a billable telephone visit.      What phone number would you like to be contacted at? [phone]  How would you like to obtain your AVS? Stacey    Abimael Francisco is a 74 year old male who is being evaluated via a billable telephone visit.      Patient has given verbal consent for Telephone visit?  Yes    Abimael Francisco complains of    Chief Complaint   Patient presents with     Follow Up   requests refills for intracavernosal injections and BPH medications.    I reviewed his recent PSA 9/23/20 which was 0.14, normal.    I have reviewed and updated the patient's Past Medical History, Social History, Family History and Medication List.    ALLERGIES  Patient has no known allergies.    Additional provider notes:  Pt with ED.  He would like refill of intracavernosal injections.  40mg Edex works about 5/10 in firmness.   He would like something stronger if possible.    He also needs refills of Flomax and finasteride as well.  These are also working well, he's able to void without trouble.  These were sent to mail order.      Exam deferred over the phone.  Attitude and demeanor is pleasant, alert & oriented and conversant.  No gross thought process defects during casual conversation.     Phone call duration:  6 Minutes starting at 13:41.    13 minutes spent on the date of the encounter doing chart review, history and exam, documentation and further activities as noted above. This includes the phone call time.  Harrison Weiss MD

## 2021-09-12 ENCOUNTER — HEALTH MAINTENANCE LETTER (OUTPATIENT)
Age: 76
End: 2021-09-12

## 2021-11-10 ENCOUNTER — TELEPHONE (OUTPATIENT)
Dept: UROLOGY | Facility: CLINIC | Age: 76
End: 2021-11-10
Payer: COMMERCIAL

## 2021-11-10 DIAGNOSIS — N52.9 ERECTILE DYSFUNCTION, UNSPECIFIED ERECTILE DYSFUNCTION TYPE: ICD-10-CM

## 2021-11-10 NOTE — TELEPHONE ENCOUNTER
[facility] Call Center    Phone Message    May a detailed message be left on voicemail: yes     Reason for Call: Medication Refill Request    Has the patient contacted the pharmacy for the refill? Yes   Name of medication being requested: alprostadil (EDEX) 40 MCG kit (Pt said this should be 50 milligrams)  Provider who prescribed the medication: Abhishek  Pharmacy: [facility]  Date medication is needed: ASAP    Pt is requesting a call back to confirm we have this refill request and are going to fill it.  Thanks.    Action Taken: Message routed to:  Adult Clinics: Urology p [id number]    Travel Screening: Not Applicable

## 2022-04-24 ENCOUNTER — HEALTH MAINTENANCE LETTER (OUTPATIENT)
Age: 77
End: 2022-04-24

## 2022-05-18 ENCOUNTER — OFFICE VISIT (OUTPATIENT)
Dept: UROLOGY | Facility: CLINIC | Age: 77
End: 2022-05-18
Payer: COMMERCIAL

## 2022-05-18 DIAGNOSIS — N40.1 BENIGN PROSTATIC HYPERPLASIA WITH WEAK URINARY STREAM: ICD-10-CM

## 2022-05-18 DIAGNOSIS — N13.8 BPH WITH OBSTRUCTION/LOWER URINARY TRACT SYMPTOMS: ICD-10-CM

## 2022-05-18 DIAGNOSIS — N52.9 ERECTILE DYSFUNCTION, UNSPECIFIED ERECTILE DYSFUNCTION TYPE: Primary | ICD-10-CM

## 2022-05-18 DIAGNOSIS — R39.12 BENIGN PROSTATIC HYPERPLASIA WITH WEAK URINARY STREAM: ICD-10-CM

## 2022-05-18 DIAGNOSIS — N40.1 BPH WITH OBSTRUCTION/LOWER URINARY TRACT SYMPTOMS: ICD-10-CM

## 2022-05-18 LAB
LH SERPL-ACNC: 10.4 IU/L (ref 3.1–34.6)
PROLACTIN SERPL-MCNC: 4 UG/L (ref 2–18)
SHBG SERPL-SCNC: 101 NMOL/L (ref 11–80)

## 2022-05-18 PROCEDURE — 84403 ASSAY OF TOTAL TESTOSTERONE: CPT | Performed by: UROLOGY

## 2022-05-18 PROCEDURE — 99214 OFFICE O/P EST MOD 30 MIN: CPT | Performed by: UROLOGY

## 2022-05-18 PROCEDURE — 84270 ASSAY OF SEX HORMONE GLOBUL: CPT | Performed by: UROLOGY

## 2022-05-18 PROCEDURE — 84146 ASSAY OF PROLACTIN: CPT | Performed by: UROLOGY

## 2022-05-18 PROCEDURE — 36415 COLL VENOUS BLD VENIPUNCTURE: CPT | Performed by: UROLOGY

## 2022-05-18 PROCEDURE — 83002 ASSAY OF GONADOTROPIN (LH): CPT | Performed by: UROLOGY

## 2022-05-18 RX ORDER — FINASTERIDE 5 MG/1
5 TABLET, FILM COATED ORAL DAILY
Qty: 90 TABLET | Refills: 3 | Status: SHIPPED | OUTPATIENT
Start: 2022-05-18 | End: 2023-05-31

## 2022-05-18 RX ORDER — TAMSULOSIN HYDROCHLORIDE 0.4 MG/1
0.4 CAPSULE ORAL DAILY
Qty: 90 CAPSULE | Refills: 3 | Status: SHIPPED | OUTPATIENT
Start: 2022-05-18 | End: 2023-05-31

## 2022-05-18 NOTE — PROGRESS NOTES
Abimael Francisco is a 76 year old male here for follow-up erectile dysfunction.    Abimael Francisco complains of    Chief Complaint   Patient presents with     Follow Up     Annual medication refill   requests refills for intracavernosal injections and BPH medications.    I reviewed his recent PSA 9/23/20 which was 0.14, normal.    I have reviewed and updated the patient's Past Medical History, Social History, Family History and Medication List.    ALLERGIES  Patient has no known allergies.    Additional provider notes:  Pt with ED.  He would like refill of intracavernosal injections.  40mg Edex works about 5/10 in firmness.   He would like something stronger if possible.  A year ago I gave him trimix #4.  For some reason this was refilled back as 50mcg/ml alprostadil Nov 2021.  He's been using up to 1.5 ml of this without good results.  Some initial response but erection does not stay.    He also needs refills of Flomax and finasteride as well.  These are also working well, he's able to void without trouble.  These were sent to mail order.    Physical Exam    General: Alert, oriented, nad.  Pleasant and conversant.  Eyes: anicteric, EOMI.  Pulse: regular  Resps: normal, non-labored.  Abdomen:  Nondistended.  Neurological - no tremors  Skin - no discoloration/ lesions noted   exam   Deferred     A/P  1. ED: refilll Trimix #4  Discussed side effects and how to take.  Discussed IPP as alternative if ICI not working well.   Blood draw for testosterone baseline  2. BPH with lower urinary tract symptoms.   Refill Flomax and finasteride.    Return to clinic 1 year, sooner if needed.     Additional Coding Information:    Problems:  4 -- two or more stable chronic illnesses    Data Reviewed  PSA x 1    Tests ordered/pending: 3 labs ordered.    Notes from other providers reviewed: N/A     Level of risk:  4 -- prescription drug management    Time spent:  14 minutes spent on the date of the encounter doing chart review,  history and exam, documentation and further activities per the note

## 2022-05-18 NOTE — NURSING NOTE
Abimael Francisco's goals for this visit include:   Chief Complaint   Patient presents with     Follow Up     Annual medication refill       He requests these members of his care team be copied on today's visit information:     PCP: Beny Ambrose    Referring Provider:  No referring provider defined for this encounter.    There were no vitals taken for this visit.    Do you need any medication refills at today's visit?     Petra Raphael LPN on 5/18/2022 at 9:07 AM

## 2022-05-20 LAB
TESTOST FREE SERPL-MCNC: 9.65 NG/DL
TESTOST SERPL-MCNC: 956 NG/DL (ref 240–950)

## 2022-05-21 NOTE — RESULT ENCOUNTER NOTE
Dear Abimael,     Here are your recent results.   Total testosterone level looks good, testosterone level is at the upper limit of normal.    Prolactin, LH, and SHBG are proteins/hormones that help regulate the testosterone level.    SHBG is elevated - this can result from liver or thyroid problems.  I recommend you talk to your primary doctor about these possible issues being present, but I think it's unlikely an underlying condition would be found.    Any sexual complaints are not due to low testosterone, your testosterone level is high.    Please let us know if you have any questions or concerns.     Aj BENITES

## 2022-11-19 ENCOUNTER — HEALTH MAINTENANCE LETTER (OUTPATIENT)
Age: 77
End: 2022-11-19

## 2022-12-20 NOTE — TELEPHONE ENCOUNTER
Patient requesting Dr Weiss call [facility] at [phone] to give verbal approval for alprostadil (EDEX) 40 MCG kit as patient changed insurance    Lot # (Optional): [id number] Lot # (Optional): [id number]

## 2023-01-25 ENCOUNTER — TELEPHONE (OUTPATIENT)
Dept: UROLOGY | Facility: CLINIC | Age: 78
End: 2023-01-25
Payer: COMMERCIAL

## 2023-01-25 NOTE — TELEPHONE ENCOUNTER
Left Voicemail (1st Attempt) for the patient to call back and schedule the following:    Appointment type: Return  Provider: Dr. Weiss  Return date: 5/18/2023  Specialty phone number: [phone]  Additonal Notes: Return in about 1 year (around 5/18/2023).    Nona galvin Procedure   Dermatology, Surgery, Urology  [facility]

## 2023-05-31 ENCOUNTER — VIRTUAL VISIT (OUTPATIENT)
Dept: UROLOGY | Facility: CLINIC | Age: 78
End: 2023-05-31
Payer: COMMERCIAL

## 2023-05-31 DIAGNOSIS — N13.8 BPH WITH OBSTRUCTION/LOWER URINARY TRACT SYMPTOMS: ICD-10-CM

## 2023-05-31 DIAGNOSIS — N40.1 BENIGN PROSTATIC HYPERPLASIA WITH WEAK URINARY STREAM: ICD-10-CM

## 2023-05-31 DIAGNOSIS — R39.12 BENIGN PROSTATIC HYPERPLASIA WITH WEAK URINARY STREAM: ICD-10-CM

## 2023-05-31 DIAGNOSIS — N52.9 ERECTILE DYSFUNCTION, UNSPECIFIED ERECTILE DYSFUNCTION TYPE: ICD-10-CM

## 2023-05-31 DIAGNOSIS — N40.1 BPH WITH OBSTRUCTION/LOWER URINARY TRACT SYMPTOMS: ICD-10-CM

## 2023-05-31 PROCEDURE — 99441 PR PHYSICIAN TELEPHONE EVALUATION 5-10 MIN: CPT | Mod: 93 | Performed by: UROLOGY

## 2023-05-31 RX ORDER — FINASTERIDE 5 MG/1
5 TABLET, FILM COATED ORAL DAILY
Qty: 90 TABLET | Refills: 3 | Status: SHIPPED | OUTPATIENT
Start: 2023-05-31 | End: 2024-09-25

## 2023-05-31 RX ORDER — TAMSULOSIN HYDROCHLORIDE 0.4 MG/1
0.4 CAPSULE ORAL DAILY
Qty: 90 CAPSULE | Refills: 3 | Status: SHIPPED | OUTPATIENT
Start: 2023-05-31 | End: 2024-09-25

## 2023-05-31 NOTE — PROGRESS NOTES
Virtual Visit Details    Type of service:  Telephone Visit   Phone call duration: 5 minutes       Ganga had a 5 min phone call, ending 0957AM.  He is in [place] but away from [facility], so a phone visit worked best for him.    Regarding BPH:  He is on Flomax and finasteride.  These are working well for him.  Both of these prescriptions were renewed today.    Regarding ED:  He is also on Trimix #4.  This works OK for him- also needs to augment with LARRY device, but overall he's very satisfied with this option.  This was renewed.  Warned him to watch for priapism episodes, seek care for any prolonged painful erections.  Prescription renewed for Trimix #4.    No other new concerns or issues.  Return to clinic 1 year, sooner if needed.      Aj BENITES

## 2023-05-31 NOTE — NURSING NOTE
Is the patient currently in the state of [place]? YES    Visit mode:TELEPHONE    If the visit is dropped, the patient can be reconnected by: TELEPHONE VISIT: Phone number: [phone]    Will anyone else be joining the visit? NO      How would you like to obtain your AVS? MyChart    Are changes needed to the allergy or medication list? NO    Reason for visit: Follow Up (ED )      Lisy Glynn on 5/31/2023 at 9:24 AM

## 2023-06-01 ENCOUNTER — HEALTH MAINTENANCE LETTER (OUTPATIENT)
Age: 78
End: 2023-06-01

## 2024-02-04 ENCOUNTER — HOSPITAL ENCOUNTER (EMERGENCY)
Dept: HOSPITAL 90 - EDH | Age: 79
Discharge: HOME | End: 2024-02-04
Payer: MEDICARE

## 2024-02-04 VITALS
SYSTOLIC BLOOD PRESSURE: 155 MMHG | OXYGEN SATURATION: 99 % | RESPIRATION RATE: 18 BRPM | HEART RATE: 68 BPM | DIASTOLIC BLOOD PRESSURE: 83 MMHG

## 2024-02-04 VITALS — HEIGHT: 71 IN | BODY MASS INDEX: 27.31 KG/M2 | WEIGHT: 195.11 LBS

## 2024-02-04 DIAGNOSIS — Z20.822: ICD-10-CM

## 2024-02-04 DIAGNOSIS — R42: Primary | ICD-10-CM

## 2024-02-04 DIAGNOSIS — I10: ICD-10-CM

## 2024-02-04 DIAGNOSIS — R11.2: ICD-10-CM

## 2024-02-04 DIAGNOSIS — E11.9: ICD-10-CM

## 2024-02-04 LAB
ALBUMIN SERPL-MCNC: 3.5 G/DL (ref 3.5–5)
ALT SERPL-CCNC: 20 U/L (ref 12–78)
APPEARANCE UR: CLEAR
AST SERPL-CCNC: 14 U/L (ref 10–37)
BASOPHILS # BLD AUTO: 0.06 K/UL (ref 0–0.2)
BASOPHILS NFR BLD AUTO: 0.7 % (ref 0–5)
BILIRUB SERPL-MCNC: 0.5 MG/DL (ref 0.2–1)
BILIRUB UR QL STRIP: NEGATIVE MG/DL
BUN SERPL-MCNC: 17 MG/DL (ref 7–18)
CHLORIDE SERPL-SCNC: 105 MMOL/L (ref 101–111)
CO2 SERPL-SCNC: 24 MMOL/L (ref 21–32)
COLOR UR: (no result)
CREAT SERPL-MCNC: 1.1 MG/DL (ref 0.5–1.5)
DEPRECATED SQUAMOUS URNS QL MICRO: (no result) /HPF (ref 0–2)
EOSINOPHIL # BLD AUTO: 0.1 K/UL (ref 0–0.7)
EOSINOPHIL NFR BLD AUTO: 1.1 % (ref 0–8)
ERYTHROCYTE [DISTWIDTH] IN BLOOD BY AUTOMATED COUNT: 12.2 % (ref 11–15.5)
GFR SERPL CREATININE-BSD FRML MDRD: 69 ML/MIN (ref 90–?)
GLUCOSE SERPL-MCNC: 199 MG/DL (ref 70–105)
GLUCOSE UR STRIP-MCNC: 150 MG/DL
HCT VFR BLD AUTO: 39.8 % (ref 42–54)
HGB UR QL STRIP: NEGATIVE
IMM GRANULOCYTES # BLD: 0.07 K/UL (ref 0–1)
KETONES UR STRIP-MCNC: 20 MG/DL
LEUKOCYTE ESTERASE UR QL STRIP: NEGATIVE LEU/UL
LYMPHOCYTES # SPEC AUTO: 1.4 K/UL (ref 1–4.8)
LYMPHOCYTES NFR SPEC AUTO: 15.5 % (ref 21–51)
MCH RBC QN AUTO: 30.2 PG (ref 27–33)
MCHC RBC AUTO-ENTMCNC: 35.2 G/DL (ref 32–36)
MCV RBC AUTO: 85.8 FL (ref 79–99)
MICRO URNS: YES
MONOCYTES # BLD AUTO: 0.4 K/UL (ref 0.1–1)
MONOCYTES NFR BLD AUTO: 4.2 % (ref 3–13)
NEUTROPHILS # BLD AUTO: 6.9 K/UL (ref 1.8–7.7)
NEUTROPHILS NFR BLD AUTO: 77.7 % (ref 40–77)
NITRITE UR QL STRIP: NEGATIVE
NRBC BLD MANUAL-RTO: 0 % (ref 0–0.19)
PH UR STRIP: 6.5 [PH] (ref 5–8)
PLATELET # BLD AUTO: 215 K/UL (ref 130–400)
POTASSIUM SERPL-SCNC: 4 MMOL/L (ref 3.5–5.1)
PROT SERPL-MCNC: 6.5 G/DL (ref 6–8.3)
PROT UR QL STRIP: NEGATIVE MG/DL
RBC # BLD AUTO: 4.64 MIL/UL (ref 4.5–6.2)
RBC #/AREA URNS HPF: (no result) /HPF (ref 0–1)
SARS-COV-2 RNA SPEC QL NAA+PROBE: (no result)
SODIUM SERPL-SCNC: 139 MMOL/L (ref 136–145)
SP GR UR STRIP: 1.02 (ref 1–1.03)
UROBILINOGEN UR STRIP-MCNC: 0.2 MG/DL (ref 0.2–1)
WBC # BLD AUTO: 8.8 K/UL (ref 4.8–10.8)
WBC #/AREA URNS HPF: (no result) /HPF (ref 0–1)

## 2024-02-04 PROCEDURE — 87804 INFLUENZA ASSAY W/OPTIC: CPT

## 2024-02-04 PROCEDURE — 84484 ASSAY OF TROPONIN QUANT: CPT

## 2024-02-04 PROCEDURE — 96361 HYDRATE IV INFUSION ADD-ON: CPT

## 2024-02-04 PROCEDURE — 96374 THER/PROPH/DIAG INJ IV PUSH: CPT

## 2024-02-04 PROCEDURE — 36415 COLL VENOUS BLD VENIPUNCTURE: CPT

## 2024-02-04 PROCEDURE — 80053 COMPREHEN METABOLIC PANEL: CPT

## 2024-02-04 PROCEDURE — 70450 CT HEAD/BRAIN W/O DYE: CPT

## 2024-02-04 PROCEDURE — 81001 URINALYSIS AUTO W/SCOPE: CPT

## 2024-02-04 PROCEDURE — 93005 ELECTROCARDIOGRAM TRACING: CPT

## 2024-02-04 PROCEDURE — 85025 COMPLETE CBC W/AUTO DIFF WBC: CPT

## 2024-02-04 PROCEDURE — 99285 EMERGENCY DEPT VISIT HI MDM: CPT

## 2024-02-04 PROCEDURE — 87635 SARS-COV-2 COVID-19 AMP PRB: CPT

## 2024-06-13 ENCOUNTER — MYC REFILL (OUTPATIENT)
Dept: UROLOGY | Facility: CLINIC | Age: 79
End: 2024-06-13
Payer: COMMERCIAL

## 2024-06-13 DIAGNOSIS — N52.9 ERECTILE DYSFUNCTION, UNSPECIFIED ERECTILE DYSFUNCTION TYPE: ICD-10-CM

## 2024-06-13 NOTE — TELEPHONE ENCOUNTER
Faxed refill request received for Trimix intracavernosal injection Trimix #4.     Reviewed chart: patient last seen by Dr. Weiss via virtual visit on 5/31/23.      Pt will need a repeat visit scheduled with Dr. Weiss prior to sending refills.    Sending message to pt via [employer].    Mitzi Martinez RN

## 2024-06-15 ENCOUNTER — HEALTH MAINTENANCE LETTER (OUTPATIENT)
Age: 79
End: 2024-06-15

## 2024-06-17 NOTE — TELEPHONE ENCOUNTER
6/17 Called and spoke to patient, appointment is currently scheduled.     Elayne galvni Complex   Orthopedics, Podiatry, Sports Medicine, Ent ,Eye , Audiology, Adult Endocrine & Diabetes, Nutrition & Medication Therapy Management Specialties   [facility]

## 2024-06-18 NOTE — CONFIDENTIAL NOTE
Faxed refill request from: [facility]  Medication request: Compounded Trimix intracavernosal injection Trimix #4 per mL: PGE1: 40 mcg Papaverine: 27.6mg Phentolamine: 1 mg   Sig: Inject 0.5mL intracavernosal as directed. Max use one daily and up to 3x/week. May titrate up in 0.1mL increments as needed.  Use lowest effective dose.   Prescription written: 5/31/23  Last Qty: 5mL  Pt's last office visit: 5/31/23  Next scheduled office visit: 9/25/24    Refill request sent to Dr. Weiss to review and advise.    Shweta Hassan RN, BSN

## 2024-09-25 ENCOUNTER — VIRTUAL VISIT (OUTPATIENT)
Dept: UROLOGY | Facility: CLINIC | Age: 79
End: 2024-09-25
Payer: COMMERCIAL

## 2024-09-25 DIAGNOSIS — R39.12 BENIGN PROSTATIC HYPERPLASIA WITH WEAK URINARY STREAM: ICD-10-CM

## 2024-09-25 DIAGNOSIS — N40.1 BENIGN PROSTATIC HYPERPLASIA WITH WEAK URINARY STREAM: ICD-10-CM

## 2024-09-25 DIAGNOSIS — N52.9 ERECTILE DYSFUNCTION, UNSPECIFIED ERECTILE DYSFUNCTION TYPE: ICD-10-CM

## 2024-09-25 DIAGNOSIS — N40.1 BPH WITH OBSTRUCTION/LOWER URINARY TRACT SYMPTOMS: ICD-10-CM

## 2024-09-25 DIAGNOSIS — N13.8 BPH WITH OBSTRUCTION/LOWER URINARY TRACT SYMPTOMS: ICD-10-CM

## 2024-09-25 PROCEDURE — 99441 PR PHYSICIAN TELEPHONE EVALUATION 5-10 MIN: CPT | Mod: 93 | Performed by: UROLOGY

## 2024-09-25 RX ORDER — FINASTERIDE 5 MG/1
5 TABLET, FILM COATED ORAL DAILY
Qty: 90 TABLET | Refills: 3 | Status: SHIPPED | OUTPATIENT
Start: 2024-09-25

## 2024-09-25 RX ORDER — IBUPROFEN 200 MG
TABLET ORAL
Qty: 20 EACH | Status: SHIPPED | OUTPATIENT
Start: 2024-09-25

## 2024-09-25 RX ORDER — TAMSULOSIN HYDROCHLORIDE 0.4 MG/1
0.4 CAPSULE ORAL DAILY
Qty: 90 CAPSULE | Refills: 3 | Status: SHIPPED | OUTPATIENT
Start: 2024-09-25

## 2024-09-25 ASSESSMENT — PAIN SCALES - GENERAL: PAINLEVEL: NO PAIN (0)

## 2024-09-25 NOTE — NURSING NOTE
Current patient location: [address] [place] [address] [address] [place] [address]    Is the patient currently in the state of [place]? YES    Visit mode:TELEPHONE    If the visit is dropped, the patient can be reconnected by: TELEPHONE VISIT: Phone number:   Telephone Information:   Mobile [phone]       Will anyone else be joining the visit? NO  (If patient encounters technical issues they should call [phone])    How would you like to obtain your AVS? MyChart    Are changes needed to the allergy or medication list? No    Are refills needed on medications prescribed by this physician? NO    Rooming Documentation:  Not applicable    Reason for visit: RECHECK    Stephanie SUH

## 2024-09-25 NOTE — PROGRESS NOTES
Virtual Visit Details    Type of service:  Telephone Visit   Phone call duration: 5 minutes       Ganga had a 5 min phone call, ending 10:28AM.  Video not available, phone visit.    Regarding BPH:  He is on Flomax and finasteride.  These are working well for him.  Both of these prescriptions were renewed today.    Regarding ED:  He is also on Trimix #4.  This works so-so, erections are not ideal.  Had tried LARRY which is not all that satisfactory either.  He would like to continue intracavernosal injections for  now.  Discussed he may try slowing increasing the dose.  He's not interested in an IPP surgery.    Prescription renewed for Trimix #4.    No other new concerns or issues.  Return to clinic 1 year, sooner if needed.      Aj BENITES

## 2024-12-29 ENCOUNTER — HEALTH MAINTENANCE LETTER (OUTPATIENT)
Age: 79
End: 2024-12-29

## 2025-04-09 ENCOUNTER — TELEPHONE (OUTPATIENT)
Dept: UROLOGY | Facility: CLINIC | Age: 80
End: 2025-04-09
Payer: COMMERCIAL

## 2025-04-09 DIAGNOSIS — N52.9 ERECTILE DYSFUNCTION, UNSPECIFIED ERECTILE DYSFUNCTION TYPE: Primary | ICD-10-CM

## 2025-04-09 NOTE — TELEPHONE ENCOUNTER
Reviewed chart.    Called pt to let him know Dr. Weiss discontinued this medication back in 2023.  I asked him if the Trimix #4 was working and he said it works so so, but he wants the EDEX for a backup medication for when he is out of town in [place] over the winter.    I attempted to clarify and see if he needs more refills versus changing a medication and pt states the EDEX worked better.  I let him know I would get a message over to Dr. Weiss to see if we could change his prescription back to EDEX.    Pt verbalized understanding.  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

## 2025-04-09 NOTE — TELEPHONE ENCOUNTER
"[facility] Call Center    Phone Message    May a detailed message be left on voicemail: yes     Reason for Call: Other: Patient would like a Rx he was prescribed before called \" EDEX\" by Harrison Weiss . I  see it on the list of meds. Please call patient to discuss     Action Taken: Message routed to:  Other: [facility]    Travel Screening: Not Applicable     Date of Service:                                                                     "

## 2025-04-11 ENCOUNTER — TELEPHONE (OUTPATIENT)
Dept: UROLOGY | Facility: CLINIC | Age: 80
End: 2025-04-11
Payer: COMMERCIAL

## 2025-04-11 NOTE — TELEPHONE ENCOUNTER
[facility] Call Center    Phone Message    May a detailed message be left on voicemail: yes     Reason for Call: Other: Patient returning call to Shweta ALARCON regarding the Edex prescription. Writer tried to get patient pharmacy information but he didn't know where he could get this filled in [place] Writer called back line several times with No answer. Please call patient again.       Action Taken: Other: [facility]    Travel Screening: Not Applicable     Date of Service:

## 2025-04-11 NOTE — TELEPHONE ENCOUNTER
Abhishek, MD Juan Mishra Laurie, RN; P [facility] [place]  Caller: Unspecified (2 days ago,  8:43 AM)  Mitzi,    he is okay to receive [facility] Edex prescription, if you can send in the prescription please:    Edex 40 mcg kit, dose 20-40 mcg, inject intracavernosal as directed.  Dispense #6, 12 refills      Thank you,  SHONA    Received the above message from Dr. Weiss. Prescription pended per Dr. Weiss. Attempted to reach patient by phone, but no answer. Left generic message with request to return call to clinic. When patient returns the call, will inform patient that Dr. Weiss okayed the Edex prescription request and will verify patient's preferred pharmacy.     Shweta Hassan RN, BSN

## 2025-04-14 ENCOUNTER — TELEPHONE (OUTPATIENT)
Dept: UROLOGY | Facility: CLINIC | Age: 80
End: 2025-04-14
Payer: COMMERCIAL

## 2025-04-14 NOTE — TELEPHONE ENCOUNTER
Attempted to reach patient by phone, but no answer. Left message with request to return call to clinic. See 4/9/25 telephone encounter.    Shweta Hassan RN, BSN

## 2025-04-14 NOTE — TELEPHONE ENCOUNTER
Called and spoke to patient who is aware that we can send the prescription for Edex to any retail pharmacy. Edex ordered per Dr. Weiss and sent to [facility] in [place] per patient request. Informed patient to return call to clinic with any questions in the future.     Shweta Hassan RN, BSN

## 2025-04-14 NOTE — TELEPHONE ENCOUNTER
[facility] Call Center    Phone Message    May a detailed message be left on voicemail: yes     Reason for Call: Other: pt is returning call regarding Rx question. Pt missed a call this morning 04/14/25. Please call pt back to discuss. Thanks      Action Taken: Message routed to:  Adult Clinics: Urology p [id number]    Travel Screening: Not Applicable     Date of Service:

## 2025-04-14 NOTE — TELEPHONE ENCOUNTER
Received message from call center with update that patient returned call to clinic on 4/11/25 and was inquiring about which pharmacy in [place] he could have the prescription sent to. See 4/11/25 telephone encounter.    Attempted to reach patient by phone, but patient unavailable. Left message with family member with request for patient to return call to clinic. When patient returns call, will offer to send this new prescription to any retail pharmacy in the [place] area. Per chart review, patient has utilized [facility] in [place] in the past.     Shweta Hassan RN, BSN